# Patient Record
Sex: FEMALE | Race: BLACK OR AFRICAN AMERICAN | Employment: OTHER | RURAL
[De-identification: names, ages, dates, MRNs, and addresses within clinical notes are randomized per-mention and may not be internally consistent; named-entity substitution may affect disease eponyms.]

---

## 2017-01-27 RX ORDER — NAPROXEN SODIUM 220 MG
TABLET ORAL
Qty: 100 SYRINGE | Refills: 3 | Status: SHIPPED | OUTPATIENT
Start: 2017-01-27

## 2017-03-08 ENCOUNTER — OFFICE VISIT (OUTPATIENT)
Dept: FAMILY MEDICINE CLINIC | Age: 72
End: 2017-03-08

## 2017-03-08 VITALS
BODY MASS INDEX: 38.25 KG/M2 | HEIGHT: 66 IN | SYSTOLIC BLOOD PRESSURE: 133 MMHG | OXYGEN SATURATION: 95 % | RESPIRATION RATE: 16 BRPM | HEART RATE: 93 BPM | TEMPERATURE: 97.8 F | DIASTOLIC BLOOD PRESSURE: 73 MMHG | WEIGHT: 238 LBS

## 2017-03-08 DIAGNOSIS — Z78.0 POST-MENOPAUSE: ICD-10-CM

## 2017-03-08 DIAGNOSIS — Z00.00 MEDICARE ANNUAL WELLNESS VISIT, SUBSEQUENT: Primary | ICD-10-CM

## 2017-03-08 DIAGNOSIS — I10 ESSENTIAL HYPERTENSION: ICD-10-CM

## 2017-03-08 DIAGNOSIS — E11.9 TYPE 2 DIABETES MELLITUS WITHOUT COMPLICATION, WITH LONG-TERM CURRENT USE OF INSULIN (HCC): ICD-10-CM

## 2017-03-08 DIAGNOSIS — E11.21 TYPE 2 DIABETES MELLITUS WITH DIABETIC NEPHROPATHY, WITH LONG-TERM CURRENT USE OF INSULIN (HCC): ICD-10-CM

## 2017-03-08 DIAGNOSIS — E78.00 PURE HYPERCHOLESTEROLEMIA: ICD-10-CM

## 2017-03-08 DIAGNOSIS — Z79.4 TYPE 2 DIABETES MELLITUS WITH DIABETIC NEPHROPATHY, WITH LONG-TERM CURRENT USE OF INSULIN (HCC): ICD-10-CM

## 2017-03-08 DIAGNOSIS — Z79.4 TYPE 2 DIABETES MELLITUS WITHOUT COMPLICATION, WITH LONG-TERM CURRENT USE OF INSULIN (HCC): ICD-10-CM

## 2017-03-08 DIAGNOSIS — E03.9 ACQUIRED HYPOTHYROIDISM: ICD-10-CM

## 2017-03-08 DIAGNOSIS — Z13.820 SCREENING FOR OSTEOPOROSIS: ICD-10-CM

## 2017-03-08 RX ORDER — LEVOTHYROXINE SODIUM 150 UG/1
TABLET ORAL
Qty: 90 TAB | Refills: 1 | Status: SHIPPED | OUTPATIENT
Start: 2017-03-08 | End: 2017-05-12 | Stop reason: SDUPTHER

## 2017-03-08 RX ORDER — FUROSEMIDE 40 MG/1
TABLET ORAL
Qty: 90 TAB | Refills: 1 | Status: SHIPPED | OUTPATIENT
Start: 2017-03-08 | End: 2017-05-12 | Stop reason: SDUPTHER

## 2017-03-08 RX ORDER — SYRINGE-NEEDLE,INSULIN,0.5 ML 30 G X1/2"
SYRINGE, EMPTY DISPOSABLE MISCELLANEOUS
Qty: 200 SYRINGE | Refills: 3 | Status: SHIPPED | OUTPATIENT
Start: 2017-03-08

## 2017-03-08 RX ORDER — AMLODIPINE BESYLATE 10 MG/1
TABLET ORAL
Qty: 90 TAB | Refills: 1 | Status: SHIPPED | OUTPATIENT
Start: 2017-03-08 | End: 2017-05-12 | Stop reason: SDUPTHER

## 2017-03-08 RX ORDER — LISINOPRIL 40 MG/1
TABLET ORAL
Qty: 90 TAB | Refills: 1 | Status: SHIPPED | OUTPATIENT
Start: 2017-03-08 | End: 2017-05-12 | Stop reason: SDUPTHER

## 2017-03-08 RX ORDER — INSULIN GLARGINE 100 [IU]/ML
INJECTION, SOLUTION SUBCUTANEOUS
Qty: 1 VIAL | Refills: 5
Start: 2017-03-08 | End: 2017-03-09 | Stop reason: SDUPTHER

## 2017-03-08 RX ORDER — INSULIN LISPRO 100 [IU]/ML
INJECTION, SOLUTION INTRAVENOUS; SUBCUTANEOUS
Qty: 1 VIAL | Refills: 5
Start: 2017-03-08 | End: 2017-03-09 | Stop reason: SDUPTHER

## 2017-03-08 NOTE — PATIENT INSTRUCTIONS

## 2017-03-08 NOTE — MR AVS SNAPSHOT
Visit Information Date & Time Provider Department Dept. Phone Encounter #  
 3/8/2017 10:00 AM Gulshan Saldana Jonathan [phone] [id number] Upcoming Health Maintenance Date Due COLONOSCOPY 2/13/1963 DTaP/Tdap/Td series (1 - Tdap) 2/13/1966 ZOSTER VACCINE AGE 60> 2/13/2005 OSTEOPOROSIS SCREENING (DEXA) 2/13/2010 EYE EXAM RETINAL OR DILATED Q1 8/23/2015 BREAST CANCER SCRN MAMMOGRAM 12/16/2016 GLAUCOMA SCREENING Q2Y 7/7/2017 FOOT EXAM Q1 8/24/2017 MICROALBUMIN Q1 8/24/2017 LIPID PANEL Q1 8/24/2017 HEMOGLOBIN A1C Q6M 9/8/2017 MEDICARE YEARLY EXAM 3/9/2018 Allergies as of 3/8/2017  Review Complete On: 3/8/2017 By: Asael Rubio LPN No Known Allergies Current Immunizations  Reviewed on 3/8/2017 Name Date Influenza Vaccine Karina Bible) 1/21/2015 Pneumococcal Conjugate (PCV-13) 1/21/2015 Reviewed by Mitali Smart MD on 3/8/2017 at 10:52 AM  
You Were Diagnosed With   
  
 Codes Comments Medicare annual wellness visit, subsequent    -  Primary ICD-10-CM: Z00.00 ICD-9-CM: V70.0 Type 2 diabetes mellitus without complication, with long-term current use of insulin (HCC)     ICD-10-CM: E11.9, Z79.4 ICD-9-CM: 250.00, V58.67 Type 2 diabetes mellitus with diabetic nephropathy, with long-term current use of insulin (HCC)     ICD-10-CM: E11.21, Z79.4 ICD-9-CM: 250.40, 583.81, V58.67 Acquired hypothyroidism     ICD-10-CM: E03.9 ICD-9-CM: 244.9 Essential hypertension     ICD-10-CM: I10 
ICD-9-CM: 401.9 Pure hypercholesterolemia     ICD-10-CM: E78.00 ICD-9-CM: 272.0 Screening for osteoporosis     ICD-10-CM: Z13.820 ICD-9-CM: V82.81 Post-menopause     ICD-10-CM: Z78.0 ICD-9-CM: V49.81 Vitals BP Pulse Temp Resp Height(growth percentile) Weight(growth percentile)  133/73 (BP 1 Location: Right arm, BP Patient Position: Sitting) 93 97.8 °F (36.6 °C) (Oral) 16 5' 6\" (1.676 m) 238 lb (108 kg) SpO2 BMI OB Status Smoking Status 95% 38.41 kg/m2 Postmenopausal Former Smoker Vitals History BMI and BSA Data Body Mass Index Body Surface Area  
 38.41 kg/m 2 2.24 m 2 Preferred Pharmacy Pharmacy Name Phone [facility] [address], [address] [phone] Your Updated Medication List  
  
   
This list is accurate as of: 3/8/17 10:57 AM.  Always use your most recent med list. amLODIPine 10 mg tablet Commonly known as:  Lennis Eagles TAKE ONE TABLET BY MOUTH ONCE DAILY  
  
 aspirin 81 mg tablet Take 81 mg by mouth daily. CENTRUM SILVER PO Take 1 Tab by mouth once over twenty-four (24) hours. Citracal + D tablet Generic drug:  calcium citrate-vitamin D3 Take 1 Tab by mouth two (2) times a day. diph,pertuss(acel),tetanus vac(PF) 2 Lf-(2.5-5-3-5 mcg)-5Lf/0.5 mL Syrg vaccine Commonly known as:  ADACEL  
0.5 mL by IntraMUSCular route once for 1 dose. furosemide 40 mg tablet Commonly known as:  LASIX TAKE ONE TABLET BY MOUTH ONCE DAILY  
  
 insulin glargine 100 unit/mL injection Commonly known as:  LANTUS INJECT 35 UNITS SUBCUTANEOUSLY PRIOR TO  BEDTIME Dx Code: E11.9  
  
 insulin lispro 100 unit/mL injection Commonly known as:  HUMALOG  
10 U before each meal. (Breakfast, lunch and Supper) Dx Code: E11.9 Insulin Needles (Disposable) 31 gauge x 1/4\" Ndle Diabetes: 250 * Insulin Syringe-Needle U-100 0.5 mL 31 gauge x 5/16 Syrg USE ONE SYRINGE  4 TIMES DAILY * Insulin Syringe-Needle U-100 1/2 mL 30 gauge x 1/2\" Syrg Use 4 times a day. Dx Code: E11.9 Iron 325 mg (65 mg iron) tablet Generic drug:  ferrous sulfate Take 325 mg by mouth Daily (before breakfast). levothyroxine 150 mcg tablet Commonly known as:  SYNTHROID  
TAKE ONE TABLET BY MOUTH ONCE DAILY BEFORE  BREAKFAST lisinopril 40 mg tablet Commonly known as:  PRINIVIL, ZESTRIL  
TAKE ONE TABLET BY MOUTH ONCE DAILY  
  
 varicella zoster vacine live 19,400 unit/0.65 mL Susr injection Commonly known as:  ZOSTAVAX  
1 Vial by SubCUTAneous route once for 1 dose. VITAMIN D3 1,000 unit Cap Generic drug:  cholecalciferol Take 1,000 Units by mouth daily. * Notice: This list has 2 medication(s) that are the same as other medications prescribed for you. Read the directions carefully, and ask your doctor or other care provider to review them with you. Prescriptions Printed Refills diph,pertuss,acel,,tetanus vac,PF, (ADACEL) 2 Lf-(2.5-5-3-5 mcg)-5Lf/0.5 mL syrg vaccine 0 Si.5 mL by IntraMUSCular route once for 1 dose. Class: Print Route: IntraMUSCular  
 varicella zoster vacine live (ZOSTAVAX) 19,400 unit/0.65 mL susr injection 0 Si Vial by SubCUTAneous route once for 1 dose. Class: Print Route: SubCUTAneous Prescriptions Sent to Pharmacy Refills  
 levothyroxine (SYNTHROID) 150 mcg tablet 1 Sig: TAKE ONE TABLET BY MOUTH ONCE DAILY BEFORE  BREAKFAST Class: Normal  
 Pharmacy: [facility] [place] [address] Ph #: [phone]  
 lisinopril (PRINIVIL, ZESTRIL) 40 mg tablet 1 Sig: TAKE ONE TABLET BY MOUTH ONCE DAILY Class: Normal  
 Pharmacy: [facility] [place] [address] Ph #: [phone]  
 furosemide (LASIX) 40 mg tablet 1 Sig: TAKE ONE TABLET BY MOUTH ONCE DAILY Class: Normal  
 Pharmacy: [facility] [address] Ph #: [phone]  
 amLODIPine (NORVASC) 10 mg tablet 1 Sig: TAKE ONE TABLET BY MOUTH ONCE DAILY Class: Normal  
 Pharmacy: [facility] [address], [address] Ph #: [phone]  Insulin Syringe-Needle U-100 1/2 mL 30 gauge x 1/2\" syrg 3  
 Sig: Use 4 times a day. Dx Code: E11.9 Class: Normal  
 Pharmacy: [address], [address] Ph #: [phone] We Performed the Following CBC WITH AUTOMATED DIFF [06757 CPT(R)] CRP, HIGH SENSITIVITY [78645 CPT(R)] HEMOGLOBIN A1C WITH EAG [45412 CPT(R)] LIPID PANEL [93165 CPT(R)] METABOLIC PANEL, COMPREHENSIVE [53769 CPT(R)] THYROID CASCADE PROFILE [[id number] Custom] To-Do List   
 03/15/2017 Imaging:  DEXA BONE DENSITY STUDY AXIAL Patient Instructions Well Visit, Over 72: Care Instructions Your Care Instructions Physical exams can help you stay healthy. Your doctor has checked your overall health and may have suggested ways to take good care of yourself. He or she also may have recommended tests. At home, you can help prevent illness with healthy eating, regular exercise, and other steps. Follow-up care is a key part of your treatment and safety. Be sure to make and go to all appointments, and call your doctor if you are having problems. It's also a good idea to know your test results and keep a list of the medicines you take. How can you care for yourself at home? · Reach and stay at a healthy weight. This will lower your risk for many problems, such as obesity, diabetes, heart disease, and high blood pressure. · Get at least 30 minutes of exercise on most days of the week. Walking is a good choice. You also may want to do other activities, such as running, swimming, cycling, or playing tennis or team sports. · Do not smoke. Smoking can make health problems worse. If you need help quitting, talk to your doctor about stop-smoking programs and medicines. These can increase your chances of quitting for good. · Protect your skin from too much sun.  When you're outdoors from 10 a.m. to 4 p.m., stay in the shade or cover up with clothing and a hat with a wide brim. Wear sunglasses that block UV rays. Even when it's cloudy, put broad-spectrum sunscreen (SPF 30 or higher) on any exposed skin. · See a dentist one or two times a year for checkups and to have your teeth cleaned. · Wear a seat belt in the car. · Limit alcohol to 2 drinks a day for men and 1 drink a day for women. Too much alcohol can cause health problems. Follow your doctor's advice about when to have certain tests. These tests can spot problems early. For men and women · Cholesterol. Your doctor will tell you how often to have this done based on your overall health and other things that can increase your risk for heart attack and stroke. · Blood pressure. Have your blood pressure checked during a routine doctor visit. Your doctor will tell you how often to check your blood pressure based on your age, your blood pressure results, and other factors. · Diabetes. Ask your doctor whether you should have tests for diabetes. · Vision. Experts recommend that you have yearly exams for glaucoma and other age-related eye problems. · Hearing. Tell your doctor if you notice any change in your hearing. You can have tests to find out how well you hear. · Colon cancer tests. Keep having colon cancer tests as your doctor recommends. You can have one of several types of tests. · Heart attack and stroke risk. At least every 4 to 6 years, you should have your risk for heart attack and stroke assessed. Your doctor uses factors such as your age, blood pressure, cholesterol, and whether you smoke or have diabetes to show what your risk for a heart attack or stroke is over the next 10 years. · Osteoporosis. Talk to your doctor about whether you should have a bone density test to find out whether you have thinning bones. Also ask your doctor about whether you should take calcium and vitamin D supplements. For women · Pap test and pelvic exam. You may no longer need a Pap test. Talk with your doctor about whether to stop or continue to have Pap tests. · Breast exam and mammogram. Ask how often you should have a mammogram, which is an X-ray of your breasts. A mammogram can spot breast cancer before it can be felt and when it is easiest to treat. · Thyroid disease. Talk to your doctor about whether to have your thyroid checked as part of a regular physical exam. Women have an increased chance of a thyroid problem. For men · Prostate exam. Talk to your doctor about whether you should have a blood test (called a PSA test) for prostate cancer. Experts disagree on whether men should have this test. Some experts recommend that you discuss the benefits and risks of the test with your doctor. · Abdominal aortic aneurysm. Ask your doctor whether you should have a test to check for an aneurysm. You may need a test if you ever smoked or if your parent, brother, sister, or child has had an aneurysm. When should you call for help? Watch closely for changes in your health, and be sure to contact your doctor if you have any problems or symptoms that concern you. Where can you learn more? Go to http://curt-akash.info/. Enter F254 in the search box to learn more about \"Well Visit, Over 65: Care Instructions. \" Current as of: July 19, 2016 Content Version: 11.1 © 4384-7898 [employer]. Care instructions adapted under license by [employer] (which disclaims liability or warranty for this information). If you have questions about a medical condition or this instruction, always ask your healthcare professional. [place] [address] any warranty or liability for your use of this information. Introducing [facility]! [facility] introduces [employer] patient portal. Now you can access parts of your medical record, email your doctor's office, and request medication refills online.    
 
1. In your internet browser, go to [email or web address] 2. Click on the First Time User? Click Here link in the Sign In box. You will see the New Member Sign Up page. 3. Enter your [employer] Access Code exactly as it appears below. You will not need to use this code after youve completed the sign-up process. If you do not sign up before the expiration date, you must request a new code. · [employer] Access Code: [email or web address] Expires: 6/6/2017 10:57 AM 
 
4. Enter the last four digits of your Social Security Number (xxxx) and Date of Birth (mm/dd/yyyy) as indicated and click Submit. You will be taken to the next sign-up page. 5. Create a [employer] ID. This will be your [employer] login ID and cannot be changed, so think of one that is secure and easy to remember. 6. Create a [employer] password. You can change your password at any time. 7. Enter your Password Reset Question and Answer. This can be used at a later time if you forget your password. 8. Enter your e-mail address. You will receive e-mail notification when new information is available in [address] 9. Click Sign Up. You can now view and download portions of your medical record. 10. Click the Download Summary menu link to download a portable copy of your medical information. If you have questions, please visit the Frequently Asked Questions section of the [employer] website. Remember, [employer] is NOT to be used for urgent needs. For medical emergencies, dial 911. Now available from your iPhone and Android! Please provide this summary of care documentation to your next provider. Your primary care clinician is listed as [address]. If you have any questions after today's visit, please call [phone].

## 2017-03-08 NOTE — PROGRESS NOTES
Identified pt with two pt identifiers(name and ). Chief Complaint   Patient presents with    Diabetes     check up    Hypertension    Cholesterol Problem    Vitamin D Deficiency    Other     Pt is also overdue for her [address]        Health Maintenance Due   Topic    COLONOSCOPY     DTaP/Tdap/Td series (1 - Tdap)    ZOSTER VACCINE AGE 60>     OSTEOPOROSIS SCREENING (DEXA)     Pneumococcal 65+ High/Highest Risk (2 of 2 - PPSV23)    EYE EXAM RETINAL OR DILATED Q1     MEDICARE YEARLY EXAM     BREAST CANCER SCRN MAMMOGRAM     HEMOGLOBIN A1C Q6M        Wt Readings from Last 3 Encounters:   17 238 lb (108 kg)   16 225 lb 6.4 oz (102.2 kg)   08/07/15 243 lb 3.2 oz (110.3 kg)     Temp Readings from Last 3 Encounters:   16 97.2 °F (36.2 °C) (Oral)   08/07/15 98.2 °F (36.8 °C) (Oral)   05/27/15 98.5 °F (36.9 °C) (Oral)     BP Readings from Last 3 Encounters:   16 118/54   08/07/15 118/72   05/27/15 131/56     Pulse Readings from Last 3 Encounters:   16 94   08/07/15 87   05/27/15 76         Learning Assessment:  :     Learning Assessment 2014   PRIMARY LEARNER Patient   HIGHEST LEVEL OF EDUCATION - PRIMARY LEARNER  > 4 YEARS OF COLLEGE   BARRIERS PRIMARY LEARNER NONE   CO-LEARNER CAREGIVER No   PRIMARY LANGUAGE ENGLISH   LEARNER PREFERENCE PRIMARY OTHER (COMMENT)   ANSWERED BY patient   RELATIONSHIP SELF       Depression Screening:  :     PHQ 2 / 9, over the last two weeks 3/8/2017   Little interest or pleasure in doing things Not at all   Feeling down, depressed or hopeless Not at all   Total Score PHQ 2 0       Fall Risk Assessment:  :     Fall Risk Assessment, last 12 mths 3/8/2017   Able to walk? Yes   Fall in past 12 months? No       Abuse Screening:  :     Abuse Screening Questionnaire 3/8/2017 10/16/2014   Do you ever feel afraid of your partner? N N   Are you in a relationship with someone who physically or mentally threatens you? N N   Is it safe for you to go home?  Brooke Vargas Y       Coordination of Care Questionnaire:  :     1) Have you been to an emergency room, urgent care clinic since your last visit? no   Hospitalized since your last visit? no             2) Have you seen or consulted any other health care providers outside of [facility] since your last visit? no  (Include any pap smears or colon screenings in this section.)    3) Do you have an Advance Directive on file? no  Are you interested in receiving information about Advance Directives? yes and she was given this information today to complete    Patient is accompanied by self. Reviewed record in preparation for visit and have obtained necessary documentation. Medication reconciliation up to date and corrected with patient at this time. Functional Ability:   Does the patient exhibit a steady gait? yes   How long did it take the patient to get up and walk from a sitting position? A few seconds   Is the patient self reliant?  (ie can do own laundry, meals, household chores)  yes     Does the patient handle his/her own medications? yes     Does the patient handle his/her own money? yes     Is the patients home safe (ie good lighting, handrails on stairs and bath, etc.)? yes     Did you notice or did patient express any hearing difficulties? no     Did you notice or did patient express any vision difficulties? Yes, and she wear glasses to correct her vision     Were distance and reading eye charts used? no       Advance Care Planning:   Patient was offered the opportunity to discuss advance care planning:  yes     Does patient have an Advance Directive:  no   If no, did you provide information on Caring Connections?   yes

## 2017-03-09 DIAGNOSIS — Z79.4 TYPE 2 DIABETES MELLITUS WITHOUT COMPLICATION, WITH LONG-TERM CURRENT USE OF INSULIN (HCC): ICD-10-CM

## 2017-03-09 DIAGNOSIS — E11.21 TYPE 2 DIABETES MELLITUS WITH DIABETIC NEPHROPATHY, WITH LONG-TERM CURRENT USE OF INSULIN (HCC): ICD-10-CM

## 2017-03-09 DIAGNOSIS — Z79.4 TYPE 2 DIABETES MELLITUS WITH DIABETIC NEPHROPATHY, WITH LONG-TERM CURRENT USE OF INSULIN (HCC): ICD-10-CM

## 2017-03-09 DIAGNOSIS — E11.9 TYPE 2 DIABETES MELLITUS WITHOUT COMPLICATION, WITH LONG-TERM CURRENT USE OF INSULIN (HCC): ICD-10-CM

## 2017-03-09 RX ORDER — INSULIN GLARGINE 100 [IU]/ML
INJECTION, SOLUTION SUBCUTANEOUS
Qty: 1 VIAL | Refills: 5 | Status: SHIPPED | OUTPATIENT
Start: 2017-03-09

## 2017-03-09 RX ORDER — INSULIN LISPRO 100 [IU]/ML
INJECTION, SOLUTION INTRAVENOUS; SUBCUTANEOUS
Qty: 1 VIAL | Refills: 5 | Status: SHIPPED | OUTPATIENT
Start: 2017-03-09 | End: 2017-05-12 | Stop reason: SDUPTHER

## 2017-03-09 NOTE — TELEPHONE ENCOUNTER
[address] rec'd all of pt's prescription that were electronically sent yesterday except for her two insulin prescriptions. Upon review of the scripts in [facility] it does show that they did not transmit through.

## 2017-03-21 NOTE — PROGRESS NOTES
Advance Care Planning (ACP) Provider Conversation Snapshot    Date of ACP Conversation: 3/8/2017  Persons included in Conversation:  patient  Length of ACP Conversation in minutes:  <16 minutes (Non-Billable)    Authorized Decision Maker (if patient is incapable of making informed decisions): This person is:   Healthcare Agent/Medical Power of  under Advance Directive          For Patients with Decision Making Capacity:   Values/Goals: Exploration of values, goals, and preferences if recovery is not expected, even with continued medical treatment in the event of:  Imminent death  Severe, permanent brain injury  \"In these circumstances, what matters most to you? \"  Care focused more on comfort or quality of life.     Conversation Outcomes / Follow-Up Plan:   Recommended completion of Advance Directive form after review of ACP materials and conversation with prospective healthcare agent

## 2017-03-21 NOTE — PROGRESS NOTES
Taylor Sanchez is a 67 y.o. female and presents for annual Medicare Wellness Visit. Problem List: Reviewed with patient and discussed risk factors. Patient Active Problem List   Diagnosis Code    Hypertension I10    Chronic kidney disease N18.9    Diabetes mellitus, type 2 ([facility] [address] E11.9    Hypothyroid E03.9       Current medical providers:  Patient Care Team:  Jacques Colindres MD as PCP - General (Pediatrics)    PSH: Reviewed with patient  Past Surgical History:   Procedure Laterality Date    HX CARPAL TUNNEL RELEASE      THYROIDECTOMY          SH: Reviewed with patient  Social History   Substance Use Topics    Smoking status: Former Smoker    Smokeless tobacco: Never Used    Alcohol use No       FH: Reviewed with patient  Family History   Problem Relation Age of Onset    Diabetes Mother     Hypertension Mother     No Known Problems Father        Medications/Allergies: Reviewed with patient  Current Outpatient Prescriptions on File Prior to Visit   Medication Sig Dispense Refill    Insulin Syringe-Needle U-100 0.5 mL 31 gauge x 5/16 syrg USE ONE SYRINGE  4 TIMES DAILY (Patient taking differently: USE ONE SYRINGE  4 TIMES DAILY Dx Code: E11.9) 100 Syringe 3    Insulin Needles, Disposable, 31 gauge X 1/4 \" ndle Diabetes: 250 (Patient taking differently: 1 needle 4 times daily Dx Code: E11.9) 443 Each 3    FOLIC ACID/MULTIVITS-MIN/LUT (CENTRUM SILVER PO) Take 1 Tab by mouth once over twenty-four (24) hours.  calcium citrate-vitamin D3 (CITRACAL + D) tablet Take 1 Tab by mouth two (2) times a day.  ferrous sulfate (IRON) 325 mg (65 mg iron) tablet Take 325 mg by mouth Daily (before breakfast).  Cholecalciferol, Vitamin D3, (VITAMIN D3) 1,000 unit Cap Take 1,000 Units by mouth daily.  aspirin 81 mg tablet Take 81 mg by mouth daily. No current facility-administered medications on file prior to visit.        No Known Allergies    Objective:  Visit Vitals    /73 (BP 1 Location: Right arm, BP Patient Position: Sitting)    Pulse 93    Temp 97.8 °F (36.6 °C) (Oral)    Resp 16    Ht 5' 6\" (1.676 m)    Wt 238 lb (108 kg)    SpO2 95%    BMI 38.41 kg/m2    Body mass index is 38.41 kg/(m^2). Assessment of cognitive impairment: Alert and oriented x 3    Depression Screen:   PHQ 2 / 9, over the last two weeks 3/8/2017   Little interest or pleasure in doing things Not at all   Feeling down, depressed or hopeless Not at all   Total Score PHQ 2 0       Fall Risk Assessment:    Fall Risk Assessment, last 12 mths 3/8/2017   Able to walk? Yes   Fall in past 12 months? No       Functional Ability:   Does the patient exhibit a steady gait? yes   How long did it take the patient to get up and walk from a sitting position? Few seconds   Is the patient self reliant?  (ie can do own laundry, meals, household chores)  yes     Does the patient handle his/her own medications? yes     Does the patient handle his/her own money? yes     Is the patients home safe (ie good lighting, handrails on stairs and bath, etc.)? yes     Did you notice or did patient express any hearing difficulties? no     Did you notice or did patient express any vision difficulties?   no     Were distance and reading eye charts used? no       Advance Care Planning:   Patient was offered the opportunity to discuss advance care planning:  yes     Does patient have an Advance Directive:  no   If no, did you provide information on Caring Connections?   yes       Plan:      Orders Placed This Encounter    DEXA BONE DENSITY STUDY AXIAL    LIPID PANEL    CRP, HIGH SENSITIVITY    HEMOGLOBIN A1C WITH EAG    METABOLIC PANEL, COMPREHENSIVE    CBC WITH AUTOMATED DIFF    THYROID CASCADE PROFILE    levothyroxine (SYNTHROID) 150 mcg tablet    lisinopril (PRINIVIL, ZESTRIL) 40 mg tablet    furosemide (LASIX) 40 mg tablet    amLODIPine (NORVASC) 10 mg tablet    DISCONTD: insulin glargine (LANTUS) 100 unit/mL injection  DISCONTD: insulin lispro (HUMALOG) 100 unit/mL injection    Insulin Syringe-Needle U-100 1/2 mL 30 gauge x 1/2\" syrg    diph,pertuss,acel,,tetanus vac,PF, (ADACEL) 2 Lf-(2.5-5-3-5 mcg)-5Lf/0.5 mL syrg vaccine    varicella zoster vacine live (ZOSTAVAX) 19,400 unit/0.65 mL susr injection       Health Maintenance   Topic Date Due    COLONOSCOPY  Addressed    DTaP/Tdap/Td series (1 - Tdap) Addressed    ZOSTER VACCINE AGE 60>  Addressed    OSTEOPOROSIS SCREENING (DEXA)  Addressed    EYE EXAM RETINAL OR DILATED Q1  Addressed    BREAST CANCER SCRN MAMMOGRAM  Addressed    GLAUCOMA SCREENING Q2Y  07/07/2017    FOOT EXAM Q1  08/24/2017    MICROALBUMIN Q1  08/24/2017    LIPID PANEL Q1  08/24/2017    HEMOGLOBIN A1C Q6M  09/08/2017    MEDICARE YEARLY EXAM  03/09/2018    Hepatitis C Screening  Completed    Pneumococcal 65+ High/Highest Risk  Addressed    INFLUENZA AGE 9 TO ADULT  Addressed       ASSESSMENT and PLAN:    ICD-10-CM ICD-9-CM    1. Medicare annual wellness visit, subsequent Z00.00 V70.0 Anticipatory guidance discussed. Immunizations reviewed  HM updated. 2. Screening for osteoporosis Z13.820 V82.81 DEXA BONE DENSITY STUDY AXIAL   3. Post-menopause Z78.0 V49.81 DEXA BONE DENSITY STUDY AXIAL     72F who presents for MWV. I also incorporated a MMSE into her review and she did fairly well. She is somewhat tangential, but manages to complete all required recommendations - in time. Please see the patients chronic note addressing her other issues. I have discussed the diagnosis with the patient and the intended plan as seen in the above orders. The patient has received an after-visit summary and questions were answered concerning future plans. Medication Side Effects and Warnings were discussed with patient: yes   Patient Labs were reviewed and or requested: yes   Patient Past Records were reviewed and or requested: N/A    *Patient verbalized understanding and agreement with the plan.   A copy of the After Visit Summary with personalized health plan was given to the patient today. Follow-up Disposition:  Return in about 1 year (around 3/8/2018), or if symptoms worsen or fail to improve, for Every 4-6 months for chronic problems. Aaron Espinoza

## 2017-03-21 NOTE — PROGRESS NOTES
CC:  Chief Complaint   Patient presents with    Diabetes     check up    Hypertension    Cholesterol Problem    Vitamin D Deficiency    Other     Pt is also overdue for her [address] Sch[address]z is a 67 y.o. female. HPI Comments: 72F with type II DM, HTN, Hyperlipidemia and also here for MWV. She is first here for follow up of type II DM which she reports has been under better control. She is not checking her BS daily, but when she does, they are typically below 150. She is not on a strict ADA diet. She is fasting today and will need labs. Will address HM in Medicare physical.     Diabetes     Hypertension      Cholesterol Problem     Other         ROS:  Review of Systems   All other systems reviewed and are negative. OBJECTIVE:  Visit Vitals    /73 (BP 1 Location: Right arm, BP Patient Position: Sitting)    Pulse 93    Temp 97.8 °F (36.6 °C) (Oral)    Resp 16    Ht 5' 6\" (1.676 m)    Wt 238 lb (108 kg)    SpO2 95%    BMI 38.41 kg/m2   Physical Exam   Cardiovascular: Normal rate and regular rhythm. Pulmonary/Chest: Effort normal and breath sounds normal.   Musculoskeletal: Normal range of motion. Neurological: She is alert. Skin: Skin is warm. Psychiatric: She has a normal mood and affect. Her behavior is normal.   Nursing note and vitals reviewed. ASSESSMENT and PLAN    ICD-10-CM ICD-9-CM    1.  Type 2 diabetes mellitus without complication, with long-term current use of insulin ([facility]) E11.9 250.00 Insulin Syringe-Needle U-100 1/2 mL 30 gauge x 1/2\" syrg    Z79.4 V58.67 HEMOGLOBIN A1C WITH EAG      METABOLIC PANEL, COMPREHENSIVE       insulin glargine (LANTUS) 100 unit/mL injection       insulin lispro (HUMALOG) 100 unit/mL injection    Type 2 diabetes mellitus with diabetic nephropathy, with long-term current use of insulin ([facility]) E11.21 250.40 Insulin Syringe-Needle U-100 1/2 mL 30 gauge x 1/2\" syrg    Z79.4 583.81 HEMOGLOBIN A1C WITH EAG H34.58 METABOLIC PANEL, COMPREHENSIVE       insulin glargine (LANTUS) 100 unit/mL injection       insulin lispro (HUMALOG) 100 unit/mL injection   2. Acquired hypothyroidism E03.9 244.9 levothyroxine (SYNTHROID) 150 mcg tablet      THYROID CASCADE PROFILE   3. Essential hypertension I10 401.9 lisinopril (PRINIVIL, ZESTRIL) 40 mg tablet      furosemide (LASIX) 40 mg tablet      amLODIPine (NORVASC) 10 mg tablet   4. Pure hypercholesterolemia E78.00 272.0 LIPID PANEL      CRP, HIGH SENSITIVITY      CBC WITH AUTOMATED DIFF     72F with the above concerns. Diabetes - stable and will send for labs as outlined. Hypothyroid - stable and will send for labs as well. No evidence of worsening symptoms. Hyperlipidemia - assumed stable and will advise when labs return. Hypertension - Stable. Will advise when her las return. We also did a MMSE and she had a score of 27/30. Pt verbalizes understanding of plan of care and denies further questions or concerns at this time.

## 2017-05-12 ENCOUNTER — TELEPHONE (OUTPATIENT)
Dept: FAMILY MEDICINE CLINIC | Age: 72
End: 2017-05-12

## 2017-05-12 DIAGNOSIS — I10 ESSENTIAL HYPERTENSION: ICD-10-CM

## 2017-05-12 DIAGNOSIS — E11.21 TYPE 2 DIABETES MELLITUS WITH DIABETIC NEPHROPATHY, WITH LONG-TERM CURRENT USE OF INSULIN (HCC): ICD-10-CM

## 2017-05-12 DIAGNOSIS — Z79.4 TYPE 2 DIABETES MELLITUS WITH DIABETIC NEPHROPATHY, WITH LONG-TERM CURRENT USE OF INSULIN (HCC): ICD-10-CM

## 2017-05-12 DIAGNOSIS — Z79.4 TYPE 2 DIABETES MELLITUS WITHOUT COMPLICATION, WITH LONG-TERM CURRENT USE OF INSULIN (HCC): ICD-10-CM

## 2017-05-12 DIAGNOSIS — E11.9 TYPE 2 DIABETES MELLITUS WITHOUT COMPLICATION, WITH LONG-TERM CURRENT USE OF INSULIN (HCC): ICD-10-CM

## 2017-05-12 DIAGNOSIS — E03.9 ACQUIRED HYPOTHYROIDISM: ICD-10-CM

## 2017-05-12 RX ORDER — INSULIN GLARGINE 100 [IU]/ML
INJECTION, SOLUTION SUBCUTANEOUS
Qty: 10 ML | Refills: 3 | Status: SHIPPED | OUTPATIENT
Start: 2017-05-12 | End: 2017-05-12 | Stop reason: SDUPTHER

## 2017-05-12 NOTE — TELEPHONE ENCOUNTER
Pt is calling stating that she needs all her presciptions to go via mail order now but could not tell me a name of the mail order company. She stated that if we call this number they would let us know. . [phone]

## 2017-05-16 RX ORDER — LISINOPRIL 40 MG/1
TABLET ORAL
Qty: 90 TAB | Refills: 1 | Status: SHIPPED | OUTPATIENT
Start: 2017-05-16

## 2017-05-16 RX ORDER — FUROSEMIDE 40 MG/1
TABLET ORAL
Qty: 90 TAB | Refills: 1 | Status: SHIPPED | OUTPATIENT
Start: 2017-05-16

## 2017-05-16 RX ORDER — INSULIN LISPRO 100 [IU]/ML
INJECTION, SOLUTION INTRAVENOUS; SUBCUTANEOUS
Qty: 1 VIAL | Refills: 5
Start: 2017-05-16

## 2017-05-16 RX ORDER — LEVOTHYROXINE SODIUM 150 UG/1
TABLET ORAL
Qty: 90 TAB | Refills: 1 | Status: SHIPPED | OUTPATIENT
Start: 2017-05-16 | End: 2017-05-26 | Stop reason: SDUPTHER

## 2017-05-16 RX ORDER — INSULIN GLARGINE 100 [IU]/ML
INJECTION, SOLUTION SUBCUTANEOUS
Qty: 10 ML | Refills: 3
Start: 2017-05-16

## 2017-05-16 RX ORDER — AMLODIPINE BESYLATE 10 MG/1
TABLET ORAL
Qty: 90 TAB | Refills: 1 | Status: SHIPPED | OUTPATIENT
Start: 2017-05-16

## 2017-05-23 ENCOUNTER — TELEPHONE (OUTPATIENT)
Dept: FAMILY MEDICINE CLINIC | Age: 72
End: 2017-05-23

## 2017-05-23 NOTE — TELEPHONE ENCOUNTER
Forwarded from call center. .. Pt request refill on Lantus at [facility] (p) [phone]. Pt can be reached at [phone].

## 2017-05-23 NOTE — TELEPHONE ENCOUNTER
Pt was advised via VM that lantus was erx to [address] [place] [address] on 05/16/17 with refills. I called [facility] to see if they had been shipped out the medication. they advised me they rec'd the prescriptions sent in and are awaiting co-pays for two of the medications that total $186 before medication can be shipped to pt. She was reminded to call them ASAP to pay co-pay owed so medication can be shipped and further advised if she is out of the medication to return call to the office and request refill be sent to local pharmacy.

## 2017-05-26 DIAGNOSIS — E03.9 ACQUIRED HYPOTHYROIDISM: ICD-10-CM

## 2017-05-26 RX ORDER — LEVOTHYROXINE SODIUM 150 UG/1
TABLET ORAL
Qty: 90 TAB | Refills: 1 | Status: SHIPPED | OUTPATIENT
Start: 2017-05-26

## 2017-05-26 NOTE — TELEPHONE ENCOUNTER
Pt apparently rec'd levothyroxine made by a different  through her mail order company and reports when she takes them she gets headaches. She is requesting new prescription be sent to [address] where she has had her past prescriptions filled. We discussed that she may have to pay out of pocket for the prescriptions and pt reports she is aware.

## 2017-05-26 NOTE — TELEPHONE ENCOUNTER
Forwarded from call center. .. Pt states that she can not take the medication that was prescribed to her. Pt does not have the name of the Rx. But states  She would like to go back to the old Rx. \"levothroxin 150mg\" Rx. Pt uses [address] on file. Best contact is [phone].

## 2017-06-20 ENCOUNTER — TELEPHONE (OUTPATIENT)
Dept: FAMILY MEDICINE CLINIC | Age: 72
End: 2017-06-20

## 2017-06-20 NOTE — TELEPHONE ENCOUNTER
Pt reporting pharmacy sent her wrong thyroid medication and she took it for two days and it gave her a headaches. When I asked her to describe the medication she reports she was sent Levothyroxine 0.15mg and reports she usually gets Synthroid 150 mcg. I advised her they were the same medications and the dosage was the same, just written differently, one name was a brand name and the other was the generic name and both dosages of 0.15mg and 150 mcg is the same dosage just written in different measurements. Pt was addament that she had been sent the wrong medication. She was advised per Dr. Julia Bowles to come in for consultation and bring all of her medications with her. She initially declined and then Dr. Julia Bowles spoke with pt and advised her the same information. Pt reports she will call back when she has her appt calendar in front of her to schedule appt.

## 2017-07-11 RX ORDER — INSULIN GLARGINE 100 [IU]/ML
INJECTION, SOLUTION SUBCUTANEOUS
Qty: 40 ML | Refills: 1 | Status: SHIPPED | OUTPATIENT
Start: 2017-07-11